# Patient Record
Sex: MALE | URBAN - METROPOLITAN AREA
[De-identification: names, ages, dates, MRNs, and addresses within clinical notes are randomized per-mention and may not be internally consistent; named-entity substitution may affect disease eponyms.]

---

## 2021-05-04 ENCOUNTER — OFFICE VISIT (OUTPATIENT)
Dept: URBAN - METROPOLITAN AREA CLINIC 13 | Facility: CLINIC | Age: 31
End: 2021-05-04
Payer: COMMERCIAL

## 2021-05-04 PROCEDURE — 92235 FLUORESCEIN ANGRPH MLTIFRAME: CPT | Performed by: OPHTHALMOLOGY

## 2021-05-04 PROCEDURE — 92134 CPTRZ OPH DX IMG PST SGM RTA: CPT | Performed by: OPHTHALMOLOGY

## 2021-05-04 PROCEDURE — 99204 OFFICE O/P NEW MOD 45 MIN: CPT | Performed by: OPHTHALMOLOGY

## 2021-05-04 ASSESSMENT — INTRAOCULAR PRESSURE
OS: 15
OD: 13

## 2021-05-04 NOTE — IMPRESSION/PLAN
Impression: Iridocyclitis: H20.9.
-symptoms x 3 weeks (history prior episode 3 years ago) -started on PF q 2 hrs and Cylcogel x 1 day
-exam looks improved from when Dr Rebecca Easley saw pt
-no significant PMH
-ROS negative for systemic symptoms OCT:
OD: WNL
OS: wnl FA: 
OD: mild disc staining but otherwise no vasculitis OS: wnl Plan: Will continue topical steroid at this point. PF q2hr x 2 days then change to QID. Continue Cyclogel. Will check labs: CBC, RPR-FTA ABS, Toxo IgM/G, ACE, HLA B27, Quantiferon gold.  

RTC 2 weeks DFE OU OCT OU

## 2021-06-02 ENCOUNTER — OFFICE VISIT (OUTPATIENT)
Dept: URBAN - METROPOLITAN AREA CLINIC 7 | Facility: CLINIC | Age: 31
End: 2021-06-02
Payer: COMMERCIAL

## 2021-06-02 PROCEDURE — 92134 CPTRZ OPH DX IMG PST SGM RTA: CPT | Performed by: OPHTHALMOLOGY

## 2021-06-02 PROCEDURE — 92014 COMPRE OPH EXAM EST PT 1/>: CPT | Performed by: OPHTHALMOLOGY

## 2021-06-02 ASSESSMENT — INTRAOCULAR PRESSURE
OS: 10
OD: 11

## 2021-06-02 NOTE — IMPRESSION/PLAN
Impression: Iridocyclitis: H20.9.
-symptoms x 3 weeks (history prior episode 3 years ago) -Has been using PF BID OD
-OS now with inflammation since Sunday. Pt started using PF last night OS
-no significant PMH
-ROS negative for systemic symptoms OCT:
OD: WNL
OS: wnl FA: 
OD: mild disc staining but otherwise no vasculitis OS: wnl Plan: Labs pending. Has hypopyon uveitis OS now. 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 Will start PF q2hr OS and Cyclogel BID OS. Continue PF BID OD.  

RTC 1 week DFE OU OCT OU

## 2021-06-09 ENCOUNTER — OFFICE VISIT (OUTPATIENT)
Dept: URBAN - METROPOLITAN AREA CLINIC 7 | Facility: CLINIC | Age: 31
End: 2021-06-09
Payer: COMMERCIAL

## 2021-06-09 DIAGNOSIS — H20.9 IRIDOCYCLITIS: Primary | ICD-10-CM

## 2021-06-09 PROCEDURE — 92134 CPTRZ OPH DX IMG PST SGM RTA: CPT | Performed by: OPHTHALMOLOGY

## 2021-06-09 PROCEDURE — 92012 INTRM OPH EXAM EST PATIENT: CPT | Performed by: OPHTHALMOLOGY

## 2021-06-09 RX ORDER — CYCLOPENTOLATE HYDROCHLORIDE 10 MG/ML
1 % SOLUTION/ DROPS OPHTHALMIC
Qty: 5 | Refills: 2 | Status: INACTIVE
Start: 2021-06-09 | End: 2021-06-25

## 2021-06-09 ASSESSMENT — INTRAOCULAR PRESSURE
OD: 13
OS: 8

## 2021-06-09 NOTE — IMPRESSION/PLAN
Impression: Iridocyclitis: H20.9.
-symptoms x 3 weeks (history prior episode 3 years ago) -Has been using PF BID OD
-OS now with inflammation since Sunday. Pt started using PF last night OS
-no significant PMH
-ROS negative for systemic symptoms OCT:
OD: WNL
OS: wnl FA: 
OD: mild disc staining but otherwise no vasculitis OS: wnl Plan: Improving exam. Hypopyon resolved OS with PF. Continue PF OS q2hr. OD BID. Prescribe Cyclogel OS.  

RTC 2 weeks DFE OU OCT OU

## 2021-06-23 ENCOUNTER — OFFICE VISIT (OUTPATIENT)
Dept: URBAN - METROPOLITAN AREA CLINIC 7 | Facility: CLINIC | Age: 31
End: 2021-06-23
Payer: COMMERCIAL

## 2021-06-23 PROCEDURE — 92134 CPTRZ OPH DX IMG PST SGM RTA: CPT | Performed by: OPHTHALMOLOGY

## 2021-06-23 PROCEDURE — 92012 INTRM OPH EXAM EST PATIENT: CPT | Performed by: OPHTHALMOLOGY

## 2021-06-23 ASSESSMENT — INTRAOCULAR PRESSURE
OD: 16
OS: 10

## 2021-06-23 NOTE — IMPRESSION/PLAN
Impression: Iridocyclitis: H20.9.
-improving
-history of ankylosing spondylitis OCT:
OD: WNL
OS: wnl FA: 
OD: mild disc staining but otherwise no vasculitis OS: wnl Plan: Improving exam. Pt using PF 3-4x/day OU. Pt will self-taper over next month.  

RTC 2 months DFE OU OCT OU

## 2021-08-25 ENCOUNTER — OFFICE VISIT (OUTPATIENT)
Dept: URBAN - METROPOLITAN AREA CLINIC 7 | Facility: CLINIC | Age: 31
End: 2021-08-25
Payer: COMMERCIAL

## 2021-08-25 PROCEDURE — 92134 CPTRZ OPH DX IMG PST SGM RTA: CPT | Performed by: OPHTHALMOLOGY

## 2021-08-25 PROCEDURE — 92012 INTRM OPH EXAM EST PATIENT: CPT | Performed by: OPHTHALMOLOGY

## 2021-08-25 ASSESSMENT — INTRAOCULAR PRESSURE
OD: 10
OS: 7

## 2021-08-25 NOTE — IMPRESSION/PLAN
Impression: Iridocyclitis: H20.9.
-improving
-history of ankylosing spondylitis OCT: 08/25/21 OD: WNL
OS: wnl FA: 
OD: mild disc staining but otherwise no vasculitis OS: wnl Plan: Inflammation resolved. hasn't used PF in > 1 week. Obs without treatment RTC PRN
no chest pain, no cough, and no shortness of breath.